# Patient Record
(demographics unavailable — no encounter records)

---

## 2024-10-29 NOTE — PHYSICAL EXAM
[de-identified] : - Constitutional: This is a female in no obvious distress.  - Psych: Patient is alert and oriented to person, place and time.  Patient has a normal mood and affect. - Cardiovascular: Normal pulses throughout the upper extremities.  No significant varicosities are noted in the upper extremities. - Neuro: Strength and sensation are intact throughout the upper extremities.  Patient has normal coordination.  ---  Examination of her left wrist and hand demonstrates residual swelling.  She has decreased tenderness ulnarly along the ulnocarpal joint and has decreased pain with pronation supination.  There is limitation of terminal flexion and extension of the digits which she states is improved.  There is no triggering.  She is neurovascularly intact distally today  Examination of her right hand demonstrates no residual tenderness or swelling along the A1 pulley of the middle finger.  There is no residual triggering.  There is no triggering of the other digits.  She is neurovascularly intact distally. [de-identified] :  PA, lateral, and oblique radiographs of the left wrist and hand dated 9//2024 demonstrated her distal radius fracture to be fully healed, with positive ulnar variance. 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 Moderate degenerative changes at the DIP joints. Mild degenerative changes at the PIP and MCP joints.

## 2024-10-29 NOTE — HISTORY OF PRESENT ILLNESS
[FreeTextEntry1] : 127 days status post fall resulting left distal radius fracture.  The fracture was reduced at [facility].  See prior notes.  She is in hand therapy.  I have suggested a Medrol Dosepak which she has deferred.  She had also developed a right middle finger trigger finger which was injected.  She is

## 2024-10-30 NOTE — PHYSICAL EXAM
[de-identified] : - Constitutional: This is a female in no obvious distress.  - Psych: Patient is alert and oriented to person, place and time.  Patient has a normal mood and affect. - Cardiovascular: Normal pulses throughout the upper extremities.  No significant varicosities are noted in the upper extremities. - Neuro: Strength and sensation are intact throughout the upper extremities.  Patient has normal coordination.  ---  Examination of her left wrist and hand demonstrates residual swelling.  She has decreased tenderness ulnarly along the ulnocarpal joint and has decreased pain with pronation supination.  There is limitation of terminal flexion and extension of the digits which she states is improved.  There is no triggering.  She is neurovascularly intact distally today  Examination of her right hand demonstrates no residual tenderness or swelling along the A1 pulley of the middle finger.  There is no residual triggering.  There is no triggering of the other digits.  She is neurovascularly intact distally. [de-identified] :  PA, lateral, and oblique radiographs of the left wrist and hand dated 9//2024 demonstrated her distal radius fracture to be fully healed, with positive ulnar variance. 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 Moderate degenerative changes at the DIP joints. Mild degenerative changes at the PIP and MCP joints.

## 2024-10-30 NOTE — HISTORY OF PRESENT ILLNESS
[FreeTextEntry1] : 127 days status post fall resulting left distal radius fracture.  The fracture was reduced at [facility].  See prior notes.  She is in hand therapy.  I have suggested a Medrol Dosepak which she has deferred.  She had also developed a right middle finger trigger finger which was injected.  She is

## 2024-11-13 NOTE — END OF VISIT
[FreeTextEntry3] : This note was written by Laith Lugo on 11/13/2024 acting solely as a scribe for Dr. Ray Schaffer.   All medical record entries made by the Scribe were at my, Dr. Ray Schaffer, direction and personally dictated by me on 11/13/2024. I have personally reviewed the chart and agree that the record accurately reflects my personal performance of the history, physical exam, assessment and plan.

## 2024-11-13 NOTE — PHYSICAL EXAM
[de-identified] : - Constitutional: This is a female in no obvious distress.  - Psych: Patient is alert and oriented to person, place and time.  Patient has a normal mood and affect. - Cardiovascular: Normal pulses throughout the upper extremities.  No significant varicosities are noted in the upper extremities. - Neuro: Strength and sensation are intact throughout the upper extremities.  Patient has normal coordination.  ---  Examination of her left wrist and hand demonstrates residual although decreased swelling.  She has decreased tenderness ulnarly along the ulnocarpal joint and has decreased pain with pronation supination.  There is limitation of terminal flexion and extension of the digits which she states is improved.  She can still not make a full composite fist actively.  There is no triggering.  She is neurovascularly intact distally today  [de-identified] :  PA, lateral, and oblique radiographs of the left wrist and hand dated 9//2024 demonstrated her distal radius fracture to be fully healed, with positive ulnar variance. 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 Moderate degenerative changes at the DIP joints. Mild degenerative changes at the PIP and MCP joints.

## 2024-11-13 NOTE — DISCUSSION/SUMMARY
[FreeTextEntry1] : I had a discussion regarding today's visit, the diagnosis and treatment recommendations and options.  We also discussed changes since the last visit.  At this time, I recommended observation and continued hand therapy. She was provided with an updated referral today. I discussed the option of taking a Medrol dosepak, which she again defers. She will follow up as needed.   The patient has agreed to the above plan of management and has expressed full understanding.  All questions were fully answered to the patient's satisfaction.  My cumulative time spent on today's visit was greater than 30 minutes and included: Preparation for the visit, review of the medical records, review of pertinent diagnostic studies, examination and counseling of the patient on the above diagnosis, treatment plan and prognosis, orders of diagnostic tests, medications and/or appropriate procedures and documentation in the medical records of today's visit.

## 2024-11-13 NOTE — ADDENDUM
[FreeTextEntry1] :  I, Laith Lugo, acted solely as a scribe for Dr. Schaffer on this date on 11/13/2024.

## 2024-11-13 NOTE — PHYSICAL EXAM
[de-identified] : - Constitutional: This is a female in no obvious distress.  - Psych: Patient is alert and oriented to person, place and time.  Patient has a normal mood and affect. - Cardiovascular: Normal pulses throughout the upper extremities.  No significant varicosities are noted in the upper extremities. - Neuro: Strength and sensation are intact throughout the upper extremities.  Patient has normal coordination.  ---  Examination of her left wrist and hand demonstrates residual although decreased swelling.  She has decreased tenderness ulnarly along the ulnocarpal joint and has decreased pain with pronation supination.  There is limitation of terminal flexion and extension of the digits which she states is improved.  She can still not make a full composite fist actively.  There is no triggering.  She is neurovascularly intact distally today  [de-identified] :  PA, lateral, and oblique radiographs of the left wrist and hand dated 9//2024 demonstrated her distal radius fracture to be fully healed, with positive ulnar variance. 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 Moderate degenerative changes at the DIP joints. Mild degenerative changes at the PIP and MCP joints.

## 2024-11-13 NOTE — HISTORY OF PRESENT ILLNESS
[FreeTextEntry1] : 134 days status post fall resulting left distal radius fracture.  The fracture was reduced at [facility].  See prior notes.  She is in hand therapy.  I have suggested a Medrol Dosepak which she has deferred.  She had also developed a right middle finger trigger finger which was injected.  She is having left wrist pain and rates it as a 5/10. She has swelling and radiating pain, but denies any numbness/tingling. She is in hand therapy and states that is does provide her symptoms with relief.

## 2024-11-13 NOTE — HISTORY OF PRESENT ILLNESS
[FreeTextEntry1] : 134 days status post fall resulting left distal radius fracture.  The fracture was reduced at [facility].  See prior notes.  She is in hand therapy.  I have suggested a Medrol Dosepak which she has deferred.  She had also developed a right middle finger trigger finger which was injected.  She is having left wrist pain and rates it as a 5/10. She has swelling and radiating pain, but denies any numbness/tingling. She is in hand therapy and states that is does provide her symptoms with relief.

## 2024-12-02 NOTE — HISTORY OF PRESENT ILLNESS
[FreeTextEntry1] : Ms. CAMP is an 82-year-old female who returns for follow up endocrine evaluation with regard to a history of hyperthyroidism.  Recently had a fall and fractured her left radius, no surgery required, was casted and pending physical therapy/  Patient began having palpitations in May of 2023. After undergoing bloodwork per her PCP, she was found to have hyperthyroidism and started on Methimazole. She has followed with an outside endocrinologist previously for the past 9 months. She was taking up to 10 mg 2 tabs daily, however she felt fatigued on that dose. She then continued on Methimazole 10 mg daily for the past several months.  Upon repeat labs in 02/6/2024 TSH returned back at 30.1 and was advised to stop the medication all together. Repeat on 3/07/2024 showed improvement with TSH level then at 0.91. April 2024 TSH at 0.60 however on 05/15/2024 TSH returned back at 0.18 and was re initiated back on Methimazole 5mg daily.  Denies palpitations, tremors, anxiousness or severe fatigue, temp intolerance, significant weight changes or hair loss. Too, denies any changes in skin or nails.  recent labs 08/27/2024  are as follows TSH 6.63 Free T4 1.0 Free t3 2.52 CBC and hepatic panel stable  **** methimazole was then stopped   10/23/2024 TSH 1.67 off the medication   She did undergo a thyroid US in February 2023 which, per patient, demonstrated an enlarged left lobe without distinct thyroid nodularity. We do not have a copy of these records currently.  Denies anterior neck discomfort, difficulty swallowing, or any change in the appearance of the neck region.  ____________________________________________________________________________________________________ Walks 6-9 miles daily.  Additional medical history includes that of HTN, HLD Additional Meds: Atorvastatin 50 mg, Losartan 50 mg, Eliquis 5mg 2x daily  Family Hx: Mother: glaucoma, stomach cancer Father: Stroke, HTN  PCP: Dr. Dinh Huertas

## 2024-12-02 NOTE — ADDENDUM
[FreeTextEntry1] :  case discussed and reviewed with Dr. Bryn booth to be carried out in office today

## 2025-04-03 NOTE — ADDENDUM
[FreeTextEntry1] : Blood will be drawn in office today.  This note was written by Amy Awad on 04/03/2025 acting as medical scribe for Dr. Dinh Clemente. I, Dr. Dinh Clemente, have read and attest that all the information, medical decision making and discharge instructions within are true and accurate.

## 2025-04-03 NOTE — HISTORY OF PRESENT ILLNESS
[FreeTextEntry1] : Ms. CAMP is an 82-year-old female who returns for follow up endocrine evaluation with regard to a history of hyperthyroidism and underlying graves disease.  Additional medical history includes that of hypertension, hyperlipidemia, vitamin D deficiency - Had a fall on 07/02/24 and fractured her left radius, no surgery required, was casted and is going to PT. - Recently passed a kidney stone  Patient began having palpitations in May of 2023. After undergoing bloodwork per her PCP, she was found to have hyperthyroidism and started on Methimazole. She has followed with an outside endocrinologist previously for 9 months. She was taking up to 10 mg 2 tabs daily, however she felt fatigued on that dose. She then continued on Methimazole 10 mg daily for the past several months.  Upon repeat labs in 02/6/2024 TSH returned back at 30.1 and was advised to stop the medication all together. Repeat on 3/07/2024 showed improvement with TSH level then at 0.91. April 2024 TSH at 0.60 however on 05/15/2024 TSH returned back at 0.18 and was re initiated back on Methimazole 5mg daily. Methimazole was stopped after 08/27/24 TSH returned at 6.63.  Latest TFTs stable wnl on 12/02/24. ALT returned low at 9 and mean cell HGB conc returned low at 31.4.  Denies palpitations, tremors, anxiousness or severe fatigue, temp intolerance, significant weight changes or hair loss. Too, denies any changes in skin or nails.  She did undergo a thyroid US in February 2023 which, per patient, demonstrated an enlarged left lobe without distinct thyroid nodularity. We do not have a copy of these records currently.  Denies anterior neck discomfort, difficulty swallowing, or any change in the appearance of the neck region. ____________________________________________________________________________________________________ Was not physically active in the winter due to being sick.  Additional Meds: Atorvastatin 50 mg, Losartan 50 mg, Eliquis 5mg 2x daily - Wants to d/c Eliquis but was told to f/u with another cardiologist for a 2nd opinion as per her PCP.  PCP: Dr. Dinh Huertas

## 2025-05-12 NOTE — PHYSICAL EXAM
[de-identified] : obstructive wax [Midline] : trachea located in midline position [Normal] : no rashes

## 2025-05-12 NOTE — ASSESSMENT
[FreeTextEntry1] : wax- Rx:  Debrox was prescribed and  is to be placed in both ears on a routine basis to keep them free of wax. Routine debridement suggested to keep the ears free of wax.  bilateral sensorineural hearing loss-cleared for hearing aids  f/u 6 months

## 2025-05-12 NOTE — PROCEDURE
[FreeTextEntry3] : Procedure - Cerumen Removal. Indication - Obstructing visualization of TM After informed verbal consent is obtained, cerumen was removed from the  both         ear canal(s) with a curette and suction.  Normal appearing canal(s) following removal.

## 2025-05-12 NOTE — HISTORY OF PRESENT ILLNESS
[de-identified] : 81 yo chr hearing loss w/o tinnitus or vertigo No h/o trauma no other modifying factors no other nasal or throat complaints

## 2025-05-12 NOTE — DATA REVIEWED
[de-identified] : Hearing Test performed to evaluate the extent of hearing loss and  to explain pt's symptoms  was personally reviewed and revealed Tymps-wnl Hearing-bilat SNHL